# Patient Record
Sex: FEMALE | Race: WHITE | Employment: FULL TIME | URBAN - NONMETROPOLITAN AREA
[De-identification: names, ages, dates, MRNs, and addresses within clinical notes are randomized per-mention and may not be internally consistent; named-entity substitution may affect disease eponyms.]

---

## 2017-06-28 RX ORDER — ALBUTEROL SULFATE 90 UG/1
2 AEROSOL, METERED RESPIRATORY (INHALATION) 4 TIMES DAILY PRN
Qty: 1 INHALER | Refills: 0 | Status: SHIPPED | OUTPATIENT
Start: 2017-06-28 | End: 2018-12-17 | Stop reason: SDUPTHER

## 2017-07-24 ENCOUNTER — OFFICE VISIT (OUTPATIENT)
Dept: FAMILY MEDICINE CLINIC | Age: 52
End: 2017-07-24
Payer: COMMERCIAL

## 2017-07-24 VITALS
HEART RATE: 80 BPM | DIASTOLIC BLOOD PRESSURE: 80 MMHG | HEIGHT: 67 IN | BODY MASS INDEX: 33.27 KG/M2 | WEIGHT: 212 LBS | SYSTOLIC BLOOD PRESSURE: 118 MMHG

## 2017-07-24 VITALS
TEMPERATURE: 96 F | SYSTOLIC BLOOD PRESSURE: 110 MMHG | WEIGHT: 223 LBS | HEIGHT: 67 IN | DIASTOLIC BLOOD PRESSURE: 64 MMHG | BODY MASS INDEX: 35 KG/M2
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
## 2017-07-24 DIAGNOSIS — L98.9 SKIN LESION OF RIGHT LOWER EXTREMITY: Primary | ICD-10-CM

## 2017-07-24 PROCEDURE — G8427 DOCREV CUR MEDS BY ELIG CLIN: HCPCS | Performed by: FAMILY MEDICINE

## 2017-07-24 PROCEDURE — 99999 PR BIOPSY OF SKIN LESION: CPT | Performed by: FAMILY MEDICINE

## 2017-07-24 PROCEDURE — 11100 PR BIOPSY OF SKIN LESION: CPT | Performed by: FAMILY MEDICINE

## 2017-07-24 PROCEDURE — 1036F TOBACCO NON-USER: CPT | Performed by: FAMILY MEDICINE

## 2017-07-24 PROCEDURE — G8417 CALC BMI ABV UP PARAM F/U: HCPCS | Performed by: FAMILY MEDICINE

## 2017-07-24 PROCEDURE — 99213 OFFICE O/P EST LOW 20 MIN: CPT | Performed by: FAMILY MEDICINE

## 2017-07-24 PROCEDURE — 3017F COLORECTAL CA SCREEN DOC REV: CPT | Performed by: FAMILY MEDICINE

## 2017-07-24 PROCEDURE — 3014F SCREEN MAMMO DOC REV: CPT | Performed by: FAMILY MEDICINE

## 2017-07-24 ASSESSMENT — PATIENT HEALTH QUESTIONNAIRE - PHQ9
SUM OF ALL RESPONSES TO PHQ QUESTIONS 1-9: 0
2. FEELING DOWN, DEPRESSED OR HOPELESS: 0
SUM OF ALL RESPONSES TO PHQ9 QUESTIONS 1 & 2: 0
1. LITTLE INTEREST OR PLEASURE IN DOING THINGS: 0

## 2017-09-06 ENCOUNTER — TELEPHONE (OUTPATIENT)
Dept: FAMILY MEDICINE CLINIC | Age: 52
End: 2017-09-06

## 2017-10-10 DIAGNOSIS — Z12.31 SCREENING MAMMOGRAM, ENCOUNTER FOR: Primary | ICD-10-CM

## 2018-06-05 ENCOUNTER — OFFICE VISIT (OUTPATIENT)
Dept: FAMILY MEDICINE CLINIC | Age: 53
End: 2018-06-05
Payer: COMMERCIAL

## 2018-06-05 VITALS
TEMPERATURE: 97.4 F | BODY MASS INDEX: 35.79 KG/M2 | SYSTOLIC BLOOD PRESSURE: 110 MMHG | WEIGHT: 228 LBS | OXYGEN SATURATION: 98 % | DIASTOLIC BLOOD PRESSURE: 64 MMHG | HEART RATE: 75 BPM | RESPIRATION RATE: 12 BRPM

## 2018-06-05 DIAGNOSIS — J00 ACUTE RHINITIS, UNSPECIFIED TYPE: Primary | ICD-10-CM

## 2018-06-05 PROCEDURE — 99213 OFFICE O/P EST LOW 20 MIN: CPT | Performed by: FAMILY MEDICINE

## 2018-06-05 RX ORDER — IPRATROPIUM BROMIDE 42 UG/1
2 SPRAY, METERED NASAL 4 TIMES DAILY PRN
Qty: 1 BOTTLE | Refills: 0 | Status: SHIPPED | OUTPATIENT
Start: 2018-06-05 | End: 2019-06-05

## 2018-06-07 ENCOUNTER — TELEPHONE (OUTPATIENT)
Dept: FAMILY MEDICINE CLINIC | Age: 53
End: 2018-06-07

## 2018-06-07 DIAGNOSIS — Z13.1 SCREENING FOR DIABETES MELLITUS: ICD-10-CM

## 2018-06-07 DIAGNOSIS — Z13.220 SCREENING, LIPID: Primary | ICD-10-CM

## 2018-06-14 LAB
CHOLESTEROL/HDL RATIO: 3.3 RATIO
CHOLESTEROL: 222 MG/DL
GLUCOSE: 92 MG/DL
HDL, DIRECT: 67 MG/DL
LDL CHOLESTEROL CALCULATED: 136.2 MG/DL
TRIGL SERPL-MCNC: 94 MG/DL
VLDLC SERPL CALC-MCNC: 19 MG/DL

## 2018-06-20 ENCOUNTER — OFFICE VISIT (OUTPATIENT)
Dept: FAMILY MEDICINE CLINIC | Age: 53
End: 2018-06-20
Payer: COMMERCIAL

## 2018-06-20 VITALS
DIASTOLIC BLOOD PRESSURE: 80 MMHG | SYSTOLIC BLOOD PRESSURE: 104 MMHG | BODY MASS INDEX: 34.84 KG/M2 | HEART RATE: 72 BPM | WEIGHT: 222 LBS

## 2018-06-20 DIAGNOSIS — Z23 NEED FOR PROPHYLACTIC VACCINATION AND INOCULATION AGAINST VARICELLA: ICD-10-CM

## 2018-06-20 DIAGNOSIS — Z00.00 WELL ADULT EXAM: Primary | ICD-10-CM

## 2018-06-20 PROCEDURE — 99396 PREV VISIT EST AGE 40-64: CPT | Performed by: FAMILY MEDICINE

## 2018-12-17 ENCOUNTER — OFFICE VISIT (OUTPATIENT)
Dept: FAMILY MEDICINE CLINIC | Age: 53
End: 2018-12-17
Payer: COMMERCIAL

## 2018-12-17 VITALS
DIASTOLIC BLOOD PRESSURE: 82 MMHG | SYSTOLIC BLOOD PRESSURE: 118 MMHG | HEART RATE: 85 BPM | WEIGHT: 224.13 LBS | OXYGEN SATURATION: 98 % | BODY MASS INDEX: 35.18 KG/M2

## 2018-12-17 DIAGNOSIS — J06.9 VIRAL URI: Primary | ICD-10-CM

## 2018-12-17 PROCEDURE — 99213 OFFICE O/P EST LOW 20 MIN: CPT | Performed by: FAMILY MEDICINE

## 2018-12-17 RX ORDER — ALBUTEROL SULFATE 90 UG/1
2 AEROSOL, METERED RESPIRATORY (INHALATION) 4 TIMES DAILY PRN
Qty: 1 INHALER | Refills: 0 | Status: SHIPPED | OUTPATIENT
Start: 2018-12-17 | End: 2020-01-23 | Stop reason: ALTCHOICE

## 2018-12-17 RX ORDER — BENZONATATE 200 MG/1
200 CAPSULE ORAL 3 TIMES DAILY PRN
Qty: 30 CAPSULE | Refills: 0 | Status: SHIPPED | OUTPATIENT
Start: 2018-12-17 | End: 2019-07-09 | Stop reason: ALTCHOICE

## 2018-12-17 RX ORDER — LORATADINE 10 MG/1
10 TABLET ORAL DAILY
COMMUNITY

## 2018-12-17 ASSESSMENT — ENCOUNTER SYMPTOMS
NAUSEA: 0
COUGH: 1
SWOLLEN GLANDS: 0
WHEEZING: 0
RHINORRHEA: 0
SORE THROAT: 0
DIARRHEA: 1

## 2018-12-17 ASSESSMENT — PATIENT HEALTH QUESTIONNAIRE - PHQ9
2. FEELING DOWN, DEPRESSED OR HOPELESS: 0
SUM OF ALL RESPONSES TO PHQ QUESTIONS 1-9: 0
1. LITTLE INTEREST OR PLEASURE IN DOING THINGS: 0
SUM OF ALL RESPONSES TO PHQ QUESTIONS 1-9: 0
SUM OF ALL RESPONSES TO PHQ9 QUESTIONS 1 & 2: 0

## 2018-12-17 NOTE — PROGRESS NOTES
[address]  Dept: [phone]  Dept J:[phone]    Constance Barbosa is a 52 y.o. female who presents today for her medical conditions/complaints as notedbelow. Constance Barbosa is c/o of URI (started last wednesday night i kind of had a fever, by thursday i just took loratadine. took robitussin last night. thought i was getting better. last night the cough really started in. cough doesnt feel tight or deep, thats what has be confused. non productive cough. nasal drainage, sinus drainage down the back of my throat. when i blow my nose its clear, ears dont hurt, throat doesnt hurt. )      HPI:     Started Weds with the chills. Thought was doing OK with the Allegra D but then last night had the hard cough increase. Coughing a lot through the night. Drainage is clear. Does not feel like her chest is tight. Cough feels deep though      URI    This is a new problem. The current episode started in the past 7 days. There has been no fever (maybe low grade at one point). Associated symptoms include congestion, coughing and diarrhea (last weekend-- before the cough all started). Pertinent negatives include no headaches, joint pain, joint swelling, nausea, neck pain, rhinorrhea, sore throat, swollen glands or wheezing. She has tried antihistamine and decongestant for the symptoms. The treatment provided mild relief.        BP Readings from Last 3 Encounters:   12/17/18 118/82   06/20/18 104/80   06/05/18 110/64          (goal 120/80)    Wt Readings from Last 3 Encounters:   12/17/18 224 lb 2 oz (101.7 kg)   06/20/18 222 lb (100.7 kg)   06/05/18 228 lb (103.4 kg)        Past Medical History:   Diagnosis Date    Allergic rhinitis     Asthma       Past Surgical History:   Procedure Laterality Date    COLONOSCOPY  02/2015    normal    HYSTERECTOMY, TOTAL ABDOMINAL      still have the ovaries    TONSILLECTOMY         Family History

## 2019-07-09 ENCOUNTER — OFFICE VISIT (OUTPATIENT)
Dept: FAMILY MEDICINE CLINIC | Age: 54
End: 2019-07-09
Payer: COMMERCIAL

## 2019-07-09 VITALS
OXYGEN SATURATION: 98 % | HEART RATE: 90 BPM | BODY MASS INDEX: 36.1 KG/M2 | SYSTOLIC BLOOD PRESSURE: 122 MMHG | DIASTOLIC BLOOD PRESSURE: 80 MMHG | WEIGHT: 230 LBS

## 2019-07-09 DIAGNOSIS — R10.31 PAIN, ABDOMINAL, RLQ: ICD-10-CM

## 2019-07-09 DIAGNOSIS — N30.01 ACUTE CYSTITIS WITH HEMATURIA: Primary | ICD-10-CM

## 2019-07-09 LAB
APPEARANCE FLUID: ABNORMAL
BILIRUBIN, POC: NEGATIVE
BLOOD URINE, POC: ABNORMAL
CLARITY, POC: ABNORMAL
COLOR, POC: ABNORMAL
GLUCOSE URINE, POC: NEGATIVE
KETONES, POC: NEGATIVE
LEUKOCYTE EST, POC: ABNORMAL
NITRITE, POC: NEGATIVE
PH, POC: 5
PROTEIN, POC: NEGATIVE
SPECIFIC GRAVITY, POC: 1.02
UROBILINOGEN, POC: 0.2

## 2019-07-09 PROCEDURE — 87086 URINE CULTURE/COLONY COUNT: CPT | Performed by: NURSE PRACTITIONER

## 2019-07-09 PROCEDURE — 81002 URINALYSIS NONAUTO W/O SCOPE: CPT | Performed by: NURSE PRACTITIONER

## 2019-07-09 PROCEDURE — 99213 OFFICE O/P EST LOW 20 MIN: CPT | Performed by: NURSE PRACTITIONER

## 2019-07-09 RX ORDER — NITROFURANTOIN 25; 75 MG/1; MG/1
100 CAPSULE ORAL 2 TIMES DAILY
Qty: 14 CAPSULE | Refills: 0 | Status: SHIPPED | OUTPATIENT
Start: 2019-07-09 | End: 2019-07-16

## 2019-07-09 ASSESSMENT — ENCOUNTER SYMPTOMS
WHEEZING: 0
BACK PAIN: 0
ABDOMINAL PAIN: 1
VOMITING: 0
NAUSEA: 0
CONSTIPATION: 0
DIARRHEA: 0
SHORTNESS OF BREATH: 0

## 2019-07-10 LAB — URINE CULTURE, ROUTINE: NORMAL

## 2019-07-12 DIAGNOSIS — N30.01 ACUTE CYSTITIS WITH HEMATURIA: ICD-10-CM

## 2019-07-12 PROCEDURE — 87086 URINE CULTURE/COLONY COUNT: CPT | Performed by: NURSE PRACTITIONER

## 2020-01-23 ENCOUNTER — OFFICE VISIT (OUTPATIENT)
Dept: FAMILY MEDICINE CLINIC | Age: 55
End: 2020-01-23
Payer: COMMERCIAL

## 2020-01-23 VITALS
BODY MASS INDEX: 36.66 KG/M2 | SYSTOLIC BLOOD PRESSURE: 122 MMHG | HEART RATE: 83 BPM | OXYGEN SATURATION: 96 % | WEIGHT: 233.6 LBS | DIASTOLIC BLOOD PRESSURE: 86 MMHG

## 2020-01-23 PROCEDURE — 90715 TDAP VACCINE 7 YRS/> IM: CPT | Performed by: FAMILY MEDICINE

## 2020-01-23 PROCEDURE — 90471 IMMUNIZATION ADMIN: CPT | Performed by: FAMILY MEDICINE

## 2020-01-23 PROCEDURE — 99213 OFFICE O/P EST LOW 20 MIN: CPT | Performed by: FAMILY MEDICINE

## 2020-01-23 RX ORDER — ALBUTEROL SULFATE 90 UG/1
2 AEROSOL, METERED RESPIRATORY (INHALATION) EVERY 4 HOURS PRN
Qty: 1 INHALER | Refills: 3 | Status: SHIPPED | OUTPATIENT
Start: 2020-01-23

## 2020-01-23 RX ORDER — HYDROXYZINE HYDROCHLORIDE 10 MG/1
10 TABLET, FILM COATED ORAL 3 TIMES DAILY PRN
Qty: 30 TABLET | Refills: 0 | Status: SHIPPED | OUTPATIENT
Start: 2020-01-23 | End: 2021-12-12 | Stop reason: SDUPTHER

## 2020-01-23 RX ORDER — PREDNISONE 20 MG/1
20 TABLET ORAL DAILY
Qty: 5 TABLET | Refills: 0 | Status: SHIPPED | OUTPATIENT
Start: 2020-01-23 | End: 2020-01-28

## 2020-01-23 ASSESSMENT — ENCOUNTER SYMPTOMS
SHORTNESS OF BREATH: 1
RHINORRHEA: 1
SORE THROAT: 0
COUGH: 1
WHEEZING: 1

## 2020-01-23 NOTE — PROGRESS NOTES
[address] [facility]  [address]  Dept: [phone]  Dept NANNETTE:[phone]    Sebas Samuel is a 62 y.o. female who presents today for her medical conditions/complaints as notedbelow. Sebas Samuel is c/o of Other (states my lungs are tight and i am coughing, i have had these sx for about a week. its not the full blown bronchitis like i have had in the past. cough seems to be dry, is non productive. when i get a full deep breath in thats when i start coughing. every now and then i get a headache, denies fever or chills. ) and Other (listed rescue inhaler on her current med list is not preferred by insurance, is open to another.)      HPI:     Cough   This is a recurrent problem. The current episode started in the past 7 days. The problem has been waxing and waning. The problem occurs every few minutes (ponce if she tries to take a deep breath will get a coughing fit.). The cough is non-productive. Associated symptoms include headaches (pressure), nasal congestion, rhinorrhea (alittle bit), shortness of breath (only if really coughing) and wheezing (feels tight, alittle rattling). Pertinent negatives include no chest pain, chills, ear congestion, ear pain, fever, myalgias, postnasal drip, rash, sore throat, sweats or weight loss. Associated symptoms comments: Does not really feel sick per se. Nothing aggravates the symptoms. She has tried nothing for the symptoms. The treatment provided no relief. Her past medical history is significant for bronchitis. No CP, no exertional dyspnea, no leg edema. Will be leaving in a few weeks and will be going to [facility] on a trip and wants to be sure she is doing better.       BP Readings from Last 3 Encounters:   01/23/20 122/86   07/09/19 122/80   12/17/18 118/82          (goal 120/80)    Wt Readings from Last 3 Encounters:   01/23/20 233 lb 9.6 oz (106 kg)   07/09/19 230 lb (104.3 kg)   12/17/18 224 lb 2 oz (101.7 kg)        Past Medical History:   Diagnosis Date    Allergic rhinitis     Asthma       Past Surgical History:   Procedure Laterality Date    COLONOSCOPY  02/2015    normal    HYSTERECTOMY, TOTAL ABDOMINAL      still have the ovaries    TONSILLECTOMY         Family History   Problem Relation Age of Onset    Other Mother         positive for Factor V leiden, spontaneous PE    Other Maternal Grandfather         blood clots       Social History     Tobacco Use    Smoking status: Never Smoker    Smokeless tobacco: Never Used   Substance Use Topics    Alcohol use: No      Current Outpatient Medications   Medication Sig Dispense Refill    hydrOXYzine (ATARAX) 10 MG tablet Take 1 tablet by mouth 3 times daily as needed for Itching 30 tablet 0    albuterol sulfate HFA (PROAIR HFA) 108 (90 Base) MCG/ACT inhaler Inhale 2 puffs into the lungs every 4 hours as needed for Wheezing Please dispense ProAir HFA inhaler 8.5 grams with counter. 1 Inhaler 3    predniSONE (DELTASONE) 20 MG tablet Take 1 tablet by mouth daily for 5 days 5 tablet 0    loratadine (CLARITIN) 10 MG tablet Take 10 mg by mouth daily      ipratropium (ATROVENT) 0.06 % nasal spray 2 sprays by Nasal route 4 times daily as needed for Rhinitis 1 Bottle 0     No current facility-administered medications for this visit. No Known Allergies    Health Maintenance   Topic Date Due    Hepatitis C screen  1965    HIV screen  07/19/1978    Shingles Vaccine (1 of 2) 07/19/2013    Breast cancer screen  10/19/2019    Lipid screen  06/14/2023    Colon cancer screen colonoscopy  02/23/2025    DTaP/Tdap/Td vaccine (3 - Td) 01/23/2030    Flu vaccine  Completed    Pneumococcal 0-64 years Vaccine  Aged Out       Subjective:      Review of Systems   Constitutional: Negative for chills, fever and weight loss. HENT: Positive for rhinorrhea (alittle bit). Negative for ear pain, postnasal drip and sore throat.     Respiratory: Positive for

## 2020-12-14 ENCOUNTER — TELEPHONE (OUTPATIENT)
Dept: FAMILY MEDICINE CLINIC | Age: 55
End: 2020-12-14

## 2020-12-14 NOTE — TELEPHONE ENCOUNTER
Pt wants to know if you could put in a order for blood work before she comes in next weds for her or if she has to get more blood work done.

## 2020-12-18 LAB
ALBUMIN SERPL-MCNC: 4.4 G/DL
ALP BLD-CCNC: 122 U/L
ALT SERPL-CCNC: 27 U/L
ANION GAP SERPL CALCULATED.3IONS-SCNC: 1.8 MMOL/L
AST SERPL-CCNC: 28 U/L
BILIRUB SERPL-MCNC: 0.4 MG/DL (ref 0.1–1.4)
BUN BLDV-MCNC: 17 MG/DL
CALCIUM SERPL-MCNC: 9.4 MG/DL
CHLORIDE BLD-SCNC: 105 MMOL/L
CHOLESTEROL, TOTAL: 278 MG/DL
CHOLESTEROL/HDL RATIO: ABNORMAL
CO2: 22 MMOL/L
CREAT SERPL-MCNC: 0.95 MG/DL
GFR CALCULATED: 67
GLUCOSE BLD-MCNC: 100 MG/DL
HDLC SERPL-MCNC: 93 MG/DL (ref 35–70)
LDL CHOLESTEROL CALCULATED: 172 MG/DL (ref 0–160)
NONHDLC SERPL-MCNC: ABNORMAL MG/DL
POTASSIUM SERPL-SCNC: 4.5 MMOL/L
SODIUM BLD-SCNC: 141 MMOL/L
TOTAL PROTEIN: 6.8
TRIGL SERPL-MCNC: 80 MG/DL
VLDLC SERPL CALC-MCNC: ABNORMAL MG/DL

## 2020-12-23 ENCOUNTER — OFFICE VISIT (OUTPATIENT)
Dept: FAMILY MEDICINE CLINIC | Age: 55
End: 2020-12-23
Payer: COMMERCIAL

## 2020-12-23 VITALS
BODY MASS INDEX: 34.53 KG/M2 | WEIGHT: 220 LBS | DIASTOLIC BLOOD PRESSURE: 72 MMHG | SYSTOLIC BLOOD PRESSURE: 114 MMHG | HEIGHT: 67 IN | OXYGEN SATURATION: 98 % | HEART RATE: 71 BPM

## 2020-12-23 PROCEDURE — 99214 OFFICE O/P EST MOD 30 MIN: CPT | Performed by: FAMILY MEDICINE

## 2020-12-23 ASSESSMENT — PATIENT HEALTH QUESTIONNAIRE - PHQ9
SUM OF ALL RESPONSES TO PHQ QUESTIONS 1-9: 0
2. FEELING DOWN, DEPRESSED OR HOPELESS: 0
SUM OF ALL RESPONSES TO PHQ9 QUESTIONS 1 & 2: 0
SUM OF ALL RESPONSES TO PHQ QUESTIONS 1-9: 0
1. LITTLE INTEREST OR PLEASURE IN DOING THINGS: 0
SUM OF ALL RESPONSES TO PHQ QUESTIONS 1-9: 0

## 2020-12-23 ASSESSMENT — ENCOUNTER SYMPTOMS: COUGH: 1

## 2020-12-23 NOTE — PROGRESS NOTES
[address] [facility]  [address]  Dept: [phone]  Dept YGE:[phone]    Mary Harper is a 60 y.o. female who presents today for her medical conditions/complaints as notedbelow. Mary Harper is c/o of Cough (positive covid in November - she is still having a cough, increased fatigue, and gets some pain at times across her chest and under both shoulder blades)      HPI:     Started with the COVID Nov 13th tested positive on Nov 16th and was pretty fatigued. High fever, responded to the OTC treatments. Pulse Ox dropped intermittently but would come up with deep breathing etc.  Is still coughing periodically and still very fatigued. Will also have an intermittent pain in her chest -- no change with movement, no change with breathing. Dull and annoying. Last a long time. Lasts maybe a day. Overall is gradually getting better but wants to make sure she is doing the right things. Does have the albuterol did not really feel like she needed it at that time. Did not feel like she had the \"true brnchitis\"- did use the tylenol for her fever when she was sick. Appetite I Ok but no taste or smell. Cough    Does have some pain across to her left posterior shoulder and in her left anterior chest wall. She really notices it the most when she is walking the dog any pulls and drinks on her chain. Does not bother her when she sleeps does not really bother her at other times. Does not bother with the cough or deep breath.   BP Readings from Last 3 Encounters:   12/23/20 114/72   01/23/20 122/86   07/09/19 122/80          (goal 120/80)    Wt Readings from Last 3 Encounters:   12/23/20 220 lb (99.8 kg)   01/23/20 233 lb 9.6 oz (106 kg)   07/09/19 230 lb (104.3 kg)        Past Medical History:   Diagnosis Date    Allergic rhinitis     Asthma       Past Surgical History:   Procedure Laterality Date    COLONOSCOPY  02/2015    normal  HYSTERECTOMY, TOTAL ABDOMINAL      still have the ovaries    TONSILLECTOMY         Family History   Problem Relation Age of Onset    Other Mother         positive for Factor V leiden, spontaneous PE    Other Maternal Grandfather         blood clots       Social History     Tobacco Use    Smoking status: Never Smoker    Smokeless tobacco: Never Used   Substance Use Topics    Alcohol use: No      Current Outpatient Medications   Medication Sig Dispense Refill    hydrOXYzine (ATARAX) 10 MG tablet Take 1 tablet by mouth 3 times daily as needed for Itching 30 tablet 0    albuterol sulfate HFA (PROAIR HFA) 108 (90 Base) MCG/ACT inhaler Inhale 2 puffs into the lungs every 4 hours as needed for Wheezing Please dispense ProAir HFA inhaler 8.5 grams with counter. 1 Inhaler 3    loratadine (CLARITIN) 10 MG tablet Take 10 mg by mouth daily      ipratropium (ATROVENT) 0.06 % nasal spray 2 sprays by Nasal route 4 times daily as needed for Rhinitis 1 Bottle 0     No current facility-administered medications for this visit. No Known Allergies    Health Maintenance   Topic Date Due    Hepatitis C screen  1965    HIV screen  07/19/1978    Breast cancer screen  01/29/2022    Colon cancer screen colonoscopy  02/23/2025    Lipid screen  12/18/2025    DTaP/Tdap/Td vaccine (3 - Td) 01/23/2030    Flu vaccine  Completed    Shingles Vaccine  Completed    Hepatitis A vaccine  Aged Out    Hepatitis B vaccine  Aged Out    Hib vaccine  Aged Out    Meningococcal (ACWY) vaccine  Aged Out    Pneumococcal 0-64 years Vaccine  Aged Out       Subjective:      Review of Systems   Respiratory: Positive for cough. Objective:     /72 (Site: Left Upper Arm, Position: Sitting, Cuff Size: Large Adult)   Pulse 71   Ht 5' 6.9\" (1.699 m)   Wt 220 lb (99.8 kg)   SpO2 98%   BMI 34.56 kg/m²     Physical Exam  Vitals signs and nursing note reviewed.    Constitutional: Appearance: Normal appearance. She is well-developed. HENT:      Head: Normocephalic and atraumatic. Eyes:      Conjunctiva/sclera: Conjunctivae normal.   Neck:      Musculoskeletal: Normal range of motion and neck supple. Thyroid: No thyromegaly. Vascular: No JVD. Cardiovascular:      Rate and Rhythm: Normal rate and regular rhythm. Heart sounds: Normal heart sounds. No murmur. No friction rub. No gallop. Pulmonary:      Effort: Pulmonary effort is normal. No respiratory distress. Breath sounds: Normal breath sounds. Chest:       Abdominal:      Palpations: Abdomen is soft. Lymphadenopathy:      Cervical: No cervical adenopathy. Skin:     General: Skin is warm. Neurological:      General: No focal deficit present. Mental Status: She is alert and oriented to person, place, and time. Assessment/Plan:      Diagnosis Orders   1. Cough       This appears to be post viral reactivity following her COVID-19 infection. Discussed signs to watch for a worsening infection fever chills shortness of breath. Is on she is continuing to improve and his cough is gradually resolving completely no further evaluation is needed. If in another 1 to 2 months her cough is not completely resolved then would consider further evaluation including chest x-ray and possible pulmonary function testing with a trial of beta agonist on a daily basis or evaluation for sinus pathology. The muscle pain is indeed just that and reassured this is likely related to the dog. If she has increasing pain or new symptoms she is to let us know otherwise can use ice heat and topical rubs as needed and try to avoid strain on the muscles when walking the dog.     Lab Results   Component Value Date    CHOL 278 12/18/2020    TRIG 80 12/18/2020    HDL 93 (A) 12/18/2020    ALT 27 12/18/2020    AST 28 12/18/2020     12/18/2020    K 4.5 12/18/2020     12/18/2020    CREATININE 0.95 12/18/2020 BUN 17 12/18/2020    CO2 22 12/18/2020       Return in about 1 year (around 12/23/2021) for Well adult. Patient given educational materials - see patientinstructions. Discussed use, benefit, and side effects of prescribed medications. All patient questions answered. Pt voiced understanding. Reviewed health maintenance. Instructed to continue current medications, diet andexercise. Patient agreed with treatment plan. Follow up as directed.      (Please note that portions of this note were completed with a voice-recognition program. Efforts were made to edit the dictation but occasionally words are mis-transcribed.)    Electronically signed by Nedra Presley MD on 12/28/2020

## 2021-02-24 ENCOUNTER — TELEPHONE (OUTPATIENT)
Dept: FAMILY MEDICINE CLINIC | Age: 56
End: 2021-02-24

## 2021-02-24 NOTE — TELEPHONE ENCOUNTER
Patient called stating she had an appointment with Dr Kia Agee in December after having covid. At that time she was told that she would be eligible to get the covid vaccine at the end of feb. She wants to know if you think she should get it and can she get it sooner due to medical reasons.

## 2021-03-01 NOTE — TELEPHONE ENCOUNTER
Yes she should go ahead and get it. At this point she does not have any health problems that would put her at higher risk and will need to get it when the vaccine is not available for her age group.

## 2021-12-10 DIAGNOSIS — L29.9 ITCHING: ICD-10-CM

## 2021-12-12 RX ORDER — HYDROXYZINE HYDROCHLORIDE 10 MG/1
10 TABLET, FILM COATED ORAL 3 TIMES DAILY PRN
Qty: 30 TABLET | Refills: 2 | Status: SHIPPED | OUTPATIENT
Start: 2021-12-12

## 2023-05-03 NOTE — PROGRESS NOTES
[address] [address] [address]  Dept: [phone]  Dept Fax: [phone]      Beka Garduno is a 52 y.o. female who presents today for her medical conditions/complaints as noted below. Chief Complaint   Patient presents with    Flank Pain     right lower abdomen/dull aching pain x 1 wk       HPI:     Abdominal Pain   This is a new problem. The current episode started 1 to 4 weeks ago (1 week). The onset quality is gradual. The problem occurs constantly. The problem has been waxing and waning. The pain is located in the RLQ. The pain is at a severity of 2/10. The pain is mild. The quality of the pain is aching. The abdominal pain does not radiate. Pertinent negatives include no constipation, diarrhea, dysuria, fever, frequency, hematuria, nausea or vomiting. Nothing aggravates the pain. The pain is relieved by nothing. She has tried nothing for the symptoms. Current Outpatient Medications   Medication Sig Dispense Refill    nitrofurantoin, macrocrystal-monohydrate, (MACROBID) 100 MG capsule Take 1 capsule by mouth 2 times daily for 7 days 14 capsule 0    loratadine (CLARITIN) 10 MG tablet Take 10 mg by mouth daily      albuterol sulfate HFA (VENTOLIN HFA) 108 (90 Base) MCG/ACT inhaler Inhale 2 puffs into the lungs 4 times daily as needed for Wheezing 1 Inhaler 0    hydrOXYzine (ATARAX) 10 MG tablet Take 1 tablet by mouth 3 times daily as needed for Itching 30 tablet 0    ipratropium (ATROVENT) 0.06 % nasal spray 2 sprays by Nasal route 4 times daily as needed for Rhinitis 1 Bottle 0     No current facility-administered medications for this visit.       No Known Allergies    Past Medical History:   Diagnosis Date    Allergic rhinitis     Asthma      Past Surgical History:   Procedure Laterality Date    COLONOSCOPY  02/2015    normal    HYSTERECTOMY, TOTAL ABDOMINAL      still have the ovaries    TONSILLECTOMY       Social Impression: Type 2 diab with mod nonp rtnop without macular edema, l eye: L99.6432. Plan: Retinal examination reveals non-proliferative diabetic retinopathy. The diagnosis, natural history, and prognosis of NPDR were discussed at length. The importance of blood sugar, blood pressure, and cholesterol control and their relationship to progression of diabetic retinopathy were reviewed. 

VA limited by macular ischemia

## 2025-06-25 ENCOUNTER — TRANSCRIBE ORDERS (OUTPATIENT)
Dept: GENERAL RADIOLOGY | Age: 60
End: 2025-06-25

## 2025-06-25 DIAGNOSIS — R74.8 ABNORMAL LEVELS OF OTHER SERUM ENZYMES: Primary | ICD-10-CM
